# Patient Record
Sex: FEMALE | Race: WHITE
[De-identification: names, ages, dates, MRNs, and addresses within clinical notes are randomized per-mention and may not be internally consistent; named-entity substitution may affect disease eponyms.]

---

## 2018-10-10 ENCOUNTER — HOSPITAL ENCOUNTER (OUTPATIENT)
Dept: HOSPITAL 92 - BICMAMMO | Age: 63
Discharge: HOME | End: 2018-10-10
Attending: NURSE PRACTITIONER
Payer: COMMERCIAL

## 2018-10-10 DIAGNOSIS — N64.52: Primary | ICD-10-CM

## 2018-10-10 PROCEDURE — G0279 TOMOSYNTHESIS, MAMMO: HCPCS

## 2018-10-10 PROCEDURE — 77066 DX MAMMO INCL CAD BI: CPT

## 2018-10-10 NOTE — ULT
RIGHT BREAST ULTRASOUND:

 

Comparison: Mammogram 10-10-18

 

History: Right nipple clear discharge. 

 

Technique: Multiplanar grayscale and color doppler images were obtained in a right breast ultrasound.
 

 

FINDINGS: 

Normal appearing breast parenchyma is seen. No dilated ducts are seen. No suspicious mass or shadowin
g is seen in the right breast. 

 

IMPRESSION: 

BIRADS category 1 - negative. Annual screening mammography is recommended. 

 

POS: [facility]

## 2019-06-28 ENCOUNTER — HOSPITAL ENCOUNTER (OUTPATIENT)
Dept: HOSPITAL 92 - SCSRAD | Age: 64
Discharge: HOME | End: 2019-06-28
Attending: NURSE PRACTITIONER
Payer: COMMERCIAL

## 2019-06-28 DIAGNOSIS — M54.42: Primary | ICD-10-CM

## 2019-06-28 PROCEDURE — 72100 X-RAY EXAM L-S SPINE 2/3 VWS: CPT

## 2019-06-28 NOTE — RAD
Lumbar spine 2 views:



HISTORY: Lumbago with sciatica, left side



FINDINGS: There is levoscoliosis of the lumbar spine. There is minimal anterolisthesis of L4 over L5.
 Mild degenerative changes are present. No compression fracture or bony destruction is seen.



Reported By: Ramirez Mcleod 

Electronically Signed:  6/28/2019 10:31 AM